# Patient Record
Sex: MALE | Race: WHITE | NOT HISPANIC OR LATINO | Employment: FULL TIME | URBAN - METROPOLITAN AREA
[De-identification: names, ages, dates, MRNs, and addresses within clinical notes are randomized per-mention and may not be internally consistent; named-entity substitution may affect disease eponyms.]

---

## 2017-05-03 ENCOUNTER — NON-PROVIDER VISIT (OUTPATIENT)
Dept: CARDIOLOGY | Facility: MEDICAL CENTER | Age: 57
End: 2017-05-03
Payer: COMMERCIAL

## 2017-05-03 DIAGNOSIS — I49.3 PVC (PREMATURE VENTRICULAR CONTRACTION): ICD-10-CM

## 2017-05-03 DIAGNOSIS — R00.0 SINUS TACHYCARDIA: ICD-10-CM

## 2017-05-03 DIAGNOSIS — R55 SYNCOPE AND COLLAPSE: ICD-10-CM

## 2017-05-03 DIAGNOSIS — I10 ESSENTIAL HYPERTENSION: ICD-10-CM

## 2017-05-05 DIAGNOSIS — I10 ESSENTIAL HYPERTENSION: ICD-10-CM

## 2017-05-05 DIAGNOSIS — R55 SYNCOPE AND COLLAPSE: ICD-10-CM

## 2017-05-08 LAB — EKG IMPRESSION: NORMAL

## 2017-05-08 PROCEDURE — 93224 XTRNL ECG REC UP TO 48 HRS: CPT | Performed by: INTERNAL MEDICINE

## 2017-05-11 ENCOUNTER — OFFICE VISIT (OUTPATIENT)
Dept: MEDICAL GROUP | Facility: MEDICAL CENTER | Age: 57
End: 2017-05-11
Payer: COMMERCIAL

## 2017-05-11 VITALS
TEMPERATURE: 98.1 F | BODY MASS INDEX: 27.59 KG/M2 | HEART RATE: 80 BPM | DIASTOLIC BLOOD PRESSURE: 86 MMHG | RESPIRATION RATE: 16 BRPM | WEIGHT: 215 LBS | OXYGEN SATURATION: 96 % | SYSTOLIC BLOOD PRESSURE: 124 MMHG | HEIGHT: 74 IN

## 2017-05-11 DIAGNOSIS — I10 ESSENTIAL HYPERTENSION: ICD-10-CM

## 2017-05-11 DIAGNOSIS — R19.7 DIARRHEA, UNSPECIFIED TYPE: ICD-10-CM

## 2017-05-11 DIAGNOSIS — K21.9 GASTROESOPHAGEAL REFLUX DISEASE, ESOPHAGITIS PRESENCE NOT SPECIFIED: ICD-10-CM

## 2017-05-11 DIAGNOSIS — Z76.89 ENCOUNTER TO ESTABLISH CARE: ICD-10-CM

## 2017-05-11 DIAGNOSIS — R55 SYNCOPE AND COLLAPSE: ICD-10-CM

## 2017-05-11 DIAGNOSIS — R21 RASH: ICD-10-CM

## 2017-05-11 DIAGNOSIS — R53.82 CHRONIC FATIGUE: ICD-10-CM

## 2017-05-11 PROCEDURE — 99204 OFFICE O/P NEW MOD 45 MIN: CPT | Performed by: PHYSICIAN ASSISTANT

## 2017-05-11 RX ORDER — OMEPRAZOLE 20 MG/1
20 CAPSULE, DELAYED RELEASE ORAL DAILY
Qty: 30 CAP | Refills: 3 | Status: SHIPPED | OUTPATIENT
Start: 2017-05-11 | End: 2018-12-27

## 2017-05-11 RX ORDER — CLOTRIMAZOLE 1 %
CREAM (GRAM) TOPICAL
Qty: 1 TUBE | Refills: 0 | Status: SHIPPED | OUTPATIENT
Start: 2017-05-11 | End: 2017-06-08 | Stop reason: SDUPTHER

## 2017-05-11 ASSESSMENT — PATIENT HEALTH QUESTIONNAIRE - PHQ9: CLINICAL INTERPRETATION OF PHQ2 SCORE: 0

## 2017-05-11 NOTE — ASSESSMENT & PLAN NOTE
Has a rash on his trunk that was diagnosed as tinea cruris. Oral and topical medications have not been effective. Denies itching.

## 2017-05-11 NOTE — ASSESSMENT & PLAN NOTE
Along the same time where he started his chronic fatigue he has had what he considers diarrhea. He will have soft stool once or twice daily. He had a colonoscopy performed about 3 years ago that was unremarkable. No recent weight loss. No rectal bleeding.

## 2017-05-11 NOTE — PROGRESS NOTES
Subjective:   Flash Alfred is a 57 y.o. male here today for establishing care and for 6 month history of chronic fatigue and diarrhea.    Diarrhea  Along the same time where he started his chronic fatigue he has had what he considers diarrhea. He will have soft stool once or twice daily. He had a colonoscopy performed about 3 years ago that was unremarkable. No recent weight loss. No rectal bleeding.    Syncope and collapse  This is a 57-year-old male who couple weeks ago was walking up 2 flights of stairs and passed out. He's been seen at Dr. Santos's office. He is wife looking for a new primary care. He's had a cardiac workup so far with a Holter monitor that was unremarkable. He is scheduled for an echo and stress tests.    Chronic fatigue  For the past 6 months leading up to his syncopal episode he is complaining of chronic fatigue. Fatigue is usually associated with exertion. He is not able to kayak recently. He had labs performed by his PCP which he were told her all within normal limits. Appears at CBC and chemistries were performed. Also a urinalysis. He denies that he falls asleep quickly. No significant issues with sleep apnea. No ED.    GERD (gastroesophageal reflux disease)  Has had reflux for greater than 5 years. Currently on some over-the-counter likely H2-blocker. Still has symptoms which may be worse at nighttime. He was provided a prescription and told to find it over-the-counter by his PCP. It appears they did not take the medication. Denies any dysphagia. No follow-up with GI.    HTN (hypertension)  Has a history of hypertension. Currently on Norvasc daily.    Rash  Has a rash on his trunk that was diagnosed as tinea cruris. Oral and topical medications have not been effective. Denies itching.       Current medicines (including changes today)  Current Outpatient Prescriptions   Medication Sig Dispense Refill   • omeprazole (PRILOSEC) 20 MG delayed-release capsule Take 1 Cap by  "mouth every day. 1/2 hour to same meal daily likely lunch. 30 Cap 3   • clotrimazole (LOTRIMIN) 1 % Cream Apply 3 times daily. 1 Tube 0   • amlodipine (NORVASC) 10 MG TABS Take 10 mg by mouth every day.     • alprazolam (XANAX) 0.5 MG TABS Take 1 Tab by mouth 3 times a day as needed for Anxiety. 90 Tab 2     No current facility-administered medications for this visit.     He  has a past medical history of Allergy, unspecified not elsewhere classified; Arrhythmia; GERD (gastroesophageal reflux disease); Headache; Headache, classical migraine; Hypertension; Hyperlipidemia; and Cervical spine fracture (CMS-HCC). He also has no past medical history of Anemia, Anxiety, Depression, Arthritis, Asthma, Blood transfusion, without reported diagnosis, Cancer (CMS-HCC), Unspecified cataract, CHF (congestive heart failure) (CMS-HCC), Clotting disorder (CMS-HCC), Chronic airway obstruction, not elsewhere classified, Emphysema, Diabetic neuropathy (CMS-HCC), Type II or unspecified type diabetes mellitus without mention of complication, not stated as uncontrolled, Glaucoma, Goiter, Heart attack (CMS-HCC), Heart murmur, Asymptomatic human immunodeficiency virus (HIV) infection status (CMS-HCC), IBD (inflammatory bowel disease), Kidney disease, Meningitis, Seizure (CMS-HCC), Stroke (CMS-HCC), Substance abuse, Thyroid disease, Tuberculosis, Ulcer (CMS-HCC), or Urinary tract infection, site not specified.    ROS   No chest pain, no shortness of breath, no abdominal pain and all other systems were reviewed and are negative.       Objective:     Blood pressure 124/86, pulse 80, temperature 36.7 °C (98.1 °F), resp. rate 16, height 1.877 m (6' 1.9\"), weight 97.523 kg (215 lb), SpO2 96 %. Body mass index is 27.68 kg/(m^2).   Physical Exam:  Constitutional: Alert, no distress.  Skin: Warm, dry, good turgor, erythematous raised lesions without any regular borders on his trunk..  Eye: Equal, round and reactive, conjunctiva clear, lids " normal.  ENMT: Lips without lesions, good dentition, oropharynx clear.  Neck: Trachea midline, no masses.   Lymph: No cervical or supraclavicular lymphadenopathy  Respiratory: Unlabored respiratory effort, lungs clear to auscultation, no wheezes, no ronchi.  Cardiovascular: Normal S1, S2, no murmur, no edema.  Abdomen: Soft, non-tender, no masses.  Psych: Alert and oriented x3, normal affect and mood.        Assessment and Plan:   The following treatment plan was discussed    1. Chronic fatigue  Unknown etiology. Brought on with exertion. Likely cardiac. No other abnormalities and labs were recent evaluations. Referred to cardiology. Await stress testing. We'll obtain previous medical records from PCP.  - REFERRAL TO CARDIOLOGY    2. Gastroesophageal reflux disease, esophagitis presence not specified  Chronic condition and uncontrolled. Prescribed omeprazole half hour prior to lunch daily. May take H2 blocker nighttime. May refer to GI in follow-up.  - omeprazole (PRILOSEC) 20 MG delayed-release capsule; Take 1 Cap by mouth every day. 1/2 hour to same meal daily likely lunch.  Dispense: 30 Cap; Refill: 3    3. Essential hypertension  Chronic condition. Continue Norvasc as directed. Ordered lipid profile.  - LIPID PANEL  - REFERRAL TO CARDIOLOGY    4. Diarrhea, unspecified type  Chronic condition. Colonoscopy unremarkable 3 years ago. We'll await cardiac workup and then refer to GI.    5. Rash  Prescribed Chlortrimazole 1% 3 times a day for 14 days. Refer to dermatology given chronicity.  - REFERRAL TO DERMATOLOGY    6. Syncope and collapse  Unknown etiology. Continue follow-up with cardiology. Refer to cardiology.  - REFERRAL TO CARDIOLOGY    7. Encounter to establish care      Followup: Return if symptoms worsen or fail to improve.    Please note that this dictation was created using voice recognition software. I have made every reasonable attempt to correct obvious errors, but I expect that there are errors of  grammar and possibly content that I did not discover before finalizing the note.

## 2017-05-11 NOTE — Clinical Note
[facility]  Art Pruitt PA-C  [address]  [place] [address]  Fax: [phone]   Authorization for Release/Disclosure of   Protected Health Information   Name: FLASH BAH : 1960 SSN: [id number]   Address: [place] [address] Phone:    [phone] (home)    I authorize the entity listed below to release/disclose the PHI below to:   [facility]/Art Pruitt PA-C and Art Pruitt PA-C   Provider or Entity Name:  [facility]   Address   City, State, Zip   Phone:      Fax:     Reason for request: continuity of care   Information to be released:    [ xxx ] LAST COLONOSCOPY,  including any PATH REPORT and follow-up  [  ] LAST FIT/COLOGUARD RESULT [  ] LAST DEXA  [  ] LAST MAMMOGRAM  [  ] LAST PAP  [  ] LAST LABS [  ] RETINA EXAM REPORT  [  ] IMMUNIZATION RECORDS  [  ] Release all info      [  ] Check here and initial the line next to each item to release ALL health information INCLUDING  _____ Care and treatment for drug and / or alcohol abuse  _____ HIV testing, infection status, or AIDS  _____ Genetic Testing    DATES OF SERVICE OR TIME PERIOD TO BE DISCLOSED: _____________  I understand and acknowledge that:  * This Authorization may be revoked at any time by you in writing, except if your health information has already been used or disclosed.  * Your health information that will be used or disclosed as a result of you signing this authorization could be re-disclosed by the recipient. If this occurs, your re-disclosed health information may no longer be protected by State or Federal laws.  * You may refuse to sign this Authorization. Your refusal will not affect your ability to obtain treatment.  * This Authorization becomes effective upon signing and will  on (date) __________.      If no date is indicated, this Authorization will  one (1) year from the signature date.    Name: Flash Bah    Signature:   Date:     2017            PLEASE FAX REQUESTED RECORDS BACK TO: [phone]

## 2017-05-11 NOTE — Clinical Note
[facility]  Art Pruitt PA-C  [address]  [place] [address]  Fax: [phone]   Authorization for Release/Disclosure of   Protected Health Information   Name: FLASH BAH : 1960 SSN: [id number]   [place] [address] Phone:    [phone] (home)    I authorize the entity listed below to release/disclose the PHI below to:   [facility]/Art Pruitt PA-C and Art Pruitt PA-C   Provider or Entity Name:  Dr Santos   Address   City, State, [facility]   Phone:      Fax:     Reason for request: continuity of care   Information to be released:    [  ] LAST COLONOSCOPY,  including any PATH REPORT and follow-up  [  ] LAST FIT/COLOGUARD RESULT [  ] LAST DEXA  [  ] LAST MAMMOGRAM  [  ] LAST PAP  [  ] LAST LABS [  ] RETINA EXAM REPORT  [  ] IMMUNIZATION RECORDS  [ xxx ] Release all info      [  ] Check here and initial the line next to each item to release ALL health information INCLUDING  _____ Care and treatment for drug and / or alcohol abuse  _____ HIV testing, infection status, or AIDS  _____ Genetic Testing    DATES OF SERVICE OR TIME PERIOD TO BE DISCLOSED: _____________  I understand and acknowledge that:  * This Authorization may be revoked at any time by you in writing, except if your health information has already been used or disclosed.  * Your health information that will be used or disclosed as a result of you signing this authorization could be re-disclosed by the recipient. If this occurs, your re-disclosed health information may no longer be protected by State or Federal laws.  * You may refuse to sign this Authorization. Your refusal will not affect your ability to obtain treatment.  * This Authorization becomes effective upon signing and will  on (date) __________.      If no date is indicated, this Authorization will  one (1) year from the signature date.    Name: Flash Bah    Signature:   Date:          5/11/2017       PLEASE FAX REQUESTED RECORDS BACK TO: [phone]

## 2017-05-11 NOTE — ASSESSMENT & PLAN NOTE
For the past 6 months leading up to his syncopal episode he is complaining of chronic fatigue. Fatigue is usually associated with exertion. He is not able to kayak recently. He had labs performed by his PCP which he were told her all within normal limits. Appears at CBC and chemistries were performed. Also a urinalysis. He denies that he falls asleep quickly. No significant issues with sleep apnea. No ED.

## 2017-05-11 NOTE — ASSESSMENT & PLAN NOTE
Has had reflux for greater than 5 years. Currently on some over-the-counter likely H2-blocker. Still has symptoms which may be worse at nighttime. He was provided a prescription and told to find it over-the-counter by his PCP. It appears they did not take the medication. Denies any dysphagia. No follow-up with GI.

## 2017-05-11 NOTE — ASSESSMENT & PLAN NOTE
This is a 57-year-old male who couple weeks ago was walking up 2 flights of stairs and passed out. He's been seen at Dr. Santos's office. He is wife looking for a new primary care. He's had a cardiac workup so far with a Holter monitor that was unremarkable. He is scheduled for an echo and stress tests.

## 2017-05-22 LAB
CHOLEST SERPL-MCNC: 233 MG/DL (ref 100–199)
COMMENT 011824: ABNORMAL
HDLC SERPL-MCNC: 47 MG/DL
LDLC SERPL CALC-MCNC: 120 MG/DL (ref 0–99)
TRIGL SERPL-MCNC: 328 MG/DL (ref 0–149)
VLDLC SERPL CALC-MCNC: 66 MG/DL (ref 5–40)

## 2017-05-24 ENCOUNTER — OFFICE VISIT (OUTPATIENT)
Dept: CARDIOLOGY | Facility: MEDICAL CENTER | Age: 57
End: 2017-05-24
Payer: COMMERCIAL

## 2017-05-24 VITALS
BODY MASS INDEX: 27.46 KG/M2 | SYSTOLIC BLOOD PRESSURE: 124 MMHG | WEIGHT: 214 LBS | DIASTOLIC BLOOD PRESSURE: 74 MMHG | HEIGHT: 74 IN | OXYGEN SATURATION: 96 % | HEART RATE: 76 BPM

## 2017-05-24 DIAGNOSIS — R55 SYNCOPE, UNSPECIFIED SYNCOPE TYPE: ICD-10-CM

## 2017-05-24 LAB — EKG IMPRESSION: NORMAL

## 2017-05-24 PROCEDURE — 99245 OFF/OP CONSLTJ NEW/EST HI 55: CPT | Performed by: INTERNAL MEDICINE

## 2017-05-24 PROCEDURE — 93000 ELECTROCARDIOGRAM COMPLETE: CPT | Performed by: INTERNAL MEDICINE

## 2017-05-24 ASSESSMENT — ENCOUNTER SYMPTOMS
PALPITATIONS: 0
LOSS OF CONSCIOUSNESS: 1
DIZZINESS: 0

## 2017-05-24 NOTE — PROGRESS NOTES
"Subjective:   Flash Alfred is a 57 y.o. male who presents today being seen in consultation today at the request of DESTIN Pruitt for evaluation of syncope.       Feels fatigued all the time.  Almost can feel heart beating. This has been going on for a couple months.      He was at house and felt weird.  Remembered he had not taken bp med.  Went upstairs and then remebers making it to the top of stairs but woke up about 15 steps foreward.  He \"had it together\" right when he woke up.  He has never passed out before.    He felt normal after a couple minutes.  Presented to care a week later and made an appointment.    Had holter that I have personally reviewed.  There was nothing on the monitor.  Lowest hr is 56  Not close to passing out since that episode.      Father had sudden death s/p cabg but had \"massive MI and \"        Past Medical History   Diagnosis Date   • Allergy, unspecified not elsewhere classified    • Arrhythmia    • GERD (gastroesophageal reflux disease)    • Headache    • Headache, classical migraine    • Hypertension    • Hyperlipidemia    • Cervical spine fracture (CMS-HCC)      C5-C6 compression fractures-halo x 8 month     Past Surgical History   Procedure Laterality Date   • Appendectomy     • Circumcision child     • Dental extraction(s)     • Abdominal exploration     • Eye surgery       lasix     Family History   Problem Relation Age of Onset   • Heart Disease Mother    • Hypertension Mother    • Hyperlipidemia Mother    • Alcohol/Drug Mother      etoh   • Diabetes Father    • Heart Disease Father    • Hypertension Father    • Hyperlipidemia Father    • Lung Disease Father      ex smoker   • Alcohol/Drug Father      etoh   • Cancer Sister      colon   • Alcohol/Drug Sister      etoh   • Hypertension Sister    • Hyperlipidemia Sister    • Hypertension Brother    • Hyperlipidemia Brother    • Hypertension Paternal Uncle      History   Smoking status   • Former Smoker -- 0.50 " "packs/day for 30 years   • Types: Cigarettes   • Quit date: 10/01/2000   Smokeless tobacco   • Never Used     No Known Allergies  Outpatient Encounter Prescriptions as of 5/24/2017   Medication Sig Dispense Refill   • omeprazole (PRILOSEC) 20 MG delayed-release capsule Take 1 Cap by mouth every day. 1/2 hour to same meal daily likely lunch. 30 Cap 3   • clotrimazole (LOTRIMIN) 1 % Cream Apply 3 times daily. 1 Tube 0   • amlodipine (NORVASC) 10 MG TABS Take 10 mg by mouth every day.     • alprazolam (XANAX) 0.5 MG TABS Take 1 Tab by mouth 3 times a day as needed for Anxiety. 90 Tab 2     No facility-administered encounter medications on file as of 5/24/2017.     Review of Systems   Constitutional: Positive for malaise/fatigue.   Cardiovascular: Negative for chest pain and palpitations.   Neurological: Positive for loss of consciousness. Negative for dizziness.   All other systems reviewed and are negative.       Objective:   /74 mmHg  Pulse 76  Ht 1.877 m (6' 1.9\")  Wt 97.07 kg (214 lb)  BMI 27.55 kg/m2  SpO2 96%    Physical Exam   Constitutional: He is oriented to person, place, and time. He appears well-developed and well-nourished. No distress.   HENT:   Head: Normocephalic and atraumatic.   Right Ear: External ear normal.   Left Ear: External ear normal.   Nose: Nose normal.   Mouth/Throat: Oropharynx is clear and moist.   Eyes: Conjunctivae and EOM are normal. Pupils are equal, round, and reactive to light. Right eye exhibits no discharge. Left eye exhibits no discharge. No scleral icterus.   Neck: Normal range of motion. Neck supple. No JVD present. No tracheal deviation present.   Cardiovascular: Normal rate, regular rhythm, normal heart sounds and intact distal pulses.  Exam reveals no gallop and no friction rub.    No murmur heard.  Pulmonary/Chest: Effort normal and breath sounds normal. No stridor. No respiratory distress. He has no wheezes. He has no rales. He exhibits no tenderness. "   Abdominal: Soft. He exhibits no distension. There is no tenderness.   Musculoskeletal: He exhibits no edema or tenderness.   Neurological: He is alert and oriented to person, place, and time. No cranial nerve deficit. Coordination normal.   Skin: Skin is warm and dry. No rash noted. He is not diaphoretic. No erythema. No pallor.   Psychiatric: He has a normal mood and affect. His behavior is normal. Judgment and thought content normal.   Vitals reviewed.      Assessment:     1. Syncope, unspecified syncope type  EKG     Reviewed holter and see know significant arrhythmia  Normal ecg today  reviwed labs.  Total triglycerides and total cholesterol elevated.   Medical Decision Making:  Today's Assessment / Status / Plan:   Syncope- await results of stress test.  Will get it moved up.  Get echo as well.  If both normal will consider ilr  htn well controlled.  Hypertriglyceridemia- counselled diet and exercise and weight loss and limiting alcohol to 2 beers at a time.

## 2017-05-24 NOTE — Clinical Note
"     [facility]   [address]  [place] [address]  Phone: [phone]  Fax: [phone]              Flash Alfred  1960    Encounter Date: 2017    Brandon Gong M.D.          PROGRESS NOTE:  Subjective:   Flash Alfred is a 57 y.o. male who presents today being seen in consultation today at the request of DESTIN Pruitt for evaluation of syncope.       Feels fatigued all the time.  Almost can feel heart beating. This has been going on for a couple months.      He was at house and felt weird.  Remembered he had not taken bp med.  Went upstairs and then remebers making it to the top of stairs but woke up about 15 steps foreward.  He \"had it together\" right when he woke up.  He has never passed out before.    He felt normal after a couple minutes.  Presented to care a week later and made an appointment.    Had holter that I have personally reviewed.  There was nothing on the monitor.  Lowest hr is 56  Not close to passing out since that episode.      Father had sudden death s/p cabg but had \"massive MI and \"        Past Medical History   Diagnosis Date   • Allergy, unspecified not elsewhere classified    • Arrhythmia    • GERD (gastroesophageal reflux disease)    • Headache    • Headache, classical migraine    • Hypertension    • Hyperlipidemia    • Cervical spine fracture (CMS-[facility])      C5-C6 compression fractures-halo x 8 month     Past Surgical History   Procedure Laterality Date   • Appendectomy     • Circumcision child     • Dental extraction(s)     • Abdominal exploration     • Eye surgery       lasix     Family History   Problem Relation Age of Onset   • Heart Disease Mother    • Hypertension Mother    • Hyperlipidemia Mother    • Alcohol/Drug Mother      etoh   • Diabetes Father    • Heart Disease Father    • Hypertension Father    • Hyperlipidemia Father    • Lung Disease Father      ex smoker   • Alcohol/Drug Father    " "etoh   • Cancer Sister      colon   • Alcohol/Drug Sister      etoh   • Hypertension Sister    • Hyperlipidemia Sister    • Hypertension Brother    • Hyperlipidemia Brother    • Hypertension Paternal Uncle      History   Smoking status   • Former Smoker -- 0.50 packs/day for 30 years   • Types: Cigarettes   • Quit date: 10/01/2000   Smokeless tobacco   • Never Used     No Known Allergies  Outpatient Encounter Prescriptions as of 5/24/2017   Medication Sig Dispense Refill   • omeprazole (PRILOSEC) 20 MG delayed-release capsule Take 1 Cap by mouth every day. 1/2 hour to same meal daily likely lunch. 30 Cap 3   • clotrimazole (LOTRIMIN) 1 % Cream Apply 3 times daily. 1 Tube 0   • amlodipine (NORVASC) 10 MG TABS Take 10 mg by mouth every day.     • alprazolam (XANAX) 0.5 MG TABS Take 1 Tab by mouth 3 times a day as needed for Anxiety. 90 Tab 2     No facility-administered encounter medications on file as of 5/24/2017.     Review of Systems   Constitutional: Positive for malaise/fatigue.   Cardiovascular: Negative for chest pain and palpitations.   Neurological: Positive for loss of consciousness. Negative for dizziness.   All other systems reviewed and are negative.       Objective:   /74 mmHg  Pulse 76  Ht 1.877 m (6' 1.9\")  Wt 97.07 kg (214 lb)  BMI 27.55 kg/m2  SpO2 96%    Physical Exam   Constitutional: He is oriented to person, place, and time. He appears well-developed and well-nourished. No distress.   HENT:   Head: Normocephalic and atraumatic.   Right Ear: External ear normal.   Left Ear: External ear normal.   Nose: Nose normal.   Mouth/Throat: Oropharynx is clear and moist.   Eyes: Conjunctivae and EOM are normal. Pupils are equal, round, and reactive to light. Right eye exhibits no discharge. Left eye exhibits no discharge. No scleral icterus.   Neck: Normal range of motion. Neck supple. No JVD present. No tracheal deviation present.   Cardiovascular: Normal rate, regular rhythm, normal heart " sounds and intact distal pulses.  Exam reveals no gallop and no friction rub.    No murmur heard.  Pulmonary/Chest: Effort normal and breath sounds normal. No stridor. No respiratory distress. He has no wheezes. He has no rales. He exhibits no tenderness.   Abdominal: Soft. He exhibits no distension. There is no tenderness.   Musculoskeletal: He exhibits no edema or tenderness.   Neurological: He is alert and oriented to person, place, and time. No cranial nerve deficit. Coordination normal.   Skin: Skin is warm and dry. No rash noted. He is not diaphoretic. No erythema. No pallor.   Psychiatric: He has a normal mood and affect. His behavior is normal. Judgment and thought content normal.   Vitals reviewed.      Assessment:     1. Syncope, unspecified syncope type  EKG     Reviewed holter and see know significant arrhythmia  Normal ecg today  reviwed labs.  Total triglycerides and total cholesterol elevated.   Medical Decision Making:  Today's Assessment / Status / Plan:   Syncope- await results of stress test.  Will get it moved up.  Get echo as well.  If both normal will consider ilr  htn well controlled.  Hypertriglyceridemia- counselled diet and exercise and weight loss and limiting alcohol to 2 beers at a time.      MAGDA KolbC  [address]  [address]  [place] [address]  VIA In Basket

## 2017-05-24 NOTE — MR AVS SNAPSHOT
"        Flash Alfred   2017 9:00 AM   Office Visit   MRN: [id number]    Department:  [facility]   Dept Phone:  [phone]    Description:  Male : 1960   Provider:  Brandon Gong M.D.           Reason for Visit     New Patient pt states he had episode well walking up stairs1 month ago      Allergies as of 2017     No Known Allergies      You were diagnosed with     Syncope, unspecified syncope type   [[id number]]         Vital Signs     Blood Pressure Pulse Height Weight Body Mass Index Oxygen Saturation    124/74 mmHg 76 1.877 m (6' 1.9\") 97.07 kg (214 lb) 27.55 kg/m2 96%    Smoking Status                   Former Smoker           Basic Information     Date Of Birth Sex Race Ethnicity Preferred Language    1960 Male White Non- English      Your appointments     Surya 15, 2017  9:15 AM   [place] [facility] [place]   [place] [facility] [place]    [address] [place] [address]   [phone]            2017 12:15 PM   [place] [facility] [place]   [place] [facility] [place]    [address] [place] [address]   [phone]           No prep            Sep 13, 2017 10:00 AM   FOLLOW UP with Brandon Gong M.D.   [facility] (--)    [address], [address]  [place] [id number] [phone]              Problem List              ICD-10-CM Priority Class Noted - Resolved    HTN (hypertension) I10   10/1/2014 - Present    GERD (gastroesophageal reflux disease) K21.9   10/1/2014 - Present    Syncope and collapse R55   2017 - Present    Chronic fatigue R53.82   2017 - Present    Diarrhea R19.7   2017 - Present    Rash R21   2017 - Present      Health Maintenance        Date Due Completion Dates    COLONOSCOPY 2010 ---    IMM DTaP/Tdap/Td Vaccine (2 - Td) 2025            Results     EKG      Component    Report    [facility]    Test Date:  2017  Pt " Name:    ROB BAH             Department: [facility]  MRN:        [id number]                      Room:  Gender:     M                            Technician: AM  :        1960                   Requested By:MALDONADO BAJWA  Order #:    [id number]                    Reading MD: Maldonado Bajwa MD    Measurements  Intervals                                Axis  Rate:       74                           P:          57  ND:         172                          QRS:        58  QRSD:       90                           T:          65  QT:         412  QTc:        457    Interpretive Statements  SINUS RHYTHM  Compared to ECG 2015 19:10:47  No significant change    Electronically Signed On 2017 9:13:01 PDT by Maldonado Bajwa MD                          Current Immunizations     Influenza Vaccine Quad Inj (Preserved) 10/1/2014    Tdap Vaccine 2015      Below and/or attached are the medications your provider expects you to take. Review all of your home medications and newly ordered medications with your provider and/or pharmacist. Follow medication instructions as directed by your provider and/or pharmacist. Please keep your medication list with you and share with your provider. Update the information when medications are discontinued, doses are changed, or new medications (including over-the-counter products) are added; and carry medication information at all times in the event of emergency situations     Allergies:  No Known Allergies          Medications  Valid as of: May 24, 2017 -  9:50 AM    Generic Name Brand Name Tablet Size Instructions for use    ALPRAZolam (Tab) XANAX 0.5 MG Take 1 Tab by mouth 3 times a day as needed for Anxiety.        AmLODIPine Besylate (Tab) NORVASC 10 MG Take 10 mg by mouth every day.        Clotrimazole (Cream) LOTRIMIN 1 % Apply 3 times daily.        Omeprazole (CAPSULE DELAYED RELEASE) PRILOSEC 20 MG Take 1 Cap by mouth every day. 1/2 hour to same meal daily likely lunch.        .                  Medicines prescribed today were sent to:     [id number] - [place], [place] - [address]    [address] [place] [address]    Phone: [phone] Fax: [phone]    Open 24 Hours?: No      Medication refill instructions:       If your prescription bottle indicates you have medication refills left, it is not necessary to call your provider’s office. Please contact your pharmacy and they will refill your medication.    If your prescription bottle indicates you do not have any refills left, you may request refills at any time through one of the following ways: The online [employer]ENT system (except Urgent Care), by calling your provider’s office, or by asking your pharmacy to contact your provider’s office with a refill request. Medication refills are processed only during regular business hours and may not be available until the next business day. Your provider may request additional information or to have a follow-up visit with you prior to refilling your medication.   *Please Note: Medication refills are assigned a new Rx number when refilled electronically. Your pharmacy may indicate that no refills were authorized even though a new prescription for the same medication is available at the pharmacy. Please request the medicine by name with the pharmacy before contacting your provider for a refill.        Your To Do List     Future Labs/Procedures Complete By Expires    Echocardiogram Comp w/o Cont  As directed 5/24/2018         ZENT Access Code: Activation code not generated  Current ZENT Status: Active

## 2017-06-01 ENCOUNTER — HOSPITAL ENCOUNTER (OUTPATIENT)
Dept: CARDIOLOGY | Facility: MEDICAL CENTER | Age: 57
End: 2017-06-01
Attending: FAMILY MEDICINE
Payer: COMMERCIAL

## 2017-06-01 DIAGNOSIS — R06.02 SHORTNESS OF BREATH: ICD-10-CM

## 2017-06-01 DIAGNOSIS — R55 SYNCOPE AND COLLAPSE: ICD-10-CM

## 2017-06-01 LAB — LV EJECT FRACT  99904: 60

## 2017-06-01 PROCEDURE — 93312 ECHO TRANSESOPHAGEAL: CPT

## 2017-06-01 PROCEDURE — 93350 STRESS TTE ONLY: CPT

## 2017-06-01 PROCEDURE — 93018 CV STRESS TEST I&R ONLY: CPT | Performed by: INTERNAL MEDICINE

## 2017-06-01 PROCEDURE — 93350 STRESS TTE ONLY: CPT | Mod: 26 | Performed by: INTERNAL MEDICINE

## 2017-06-01 PROCEDURE — 93017 CV STRESS TEST TRACING ONLY: CPT

## 2017-06-08 RX ORDER — CLOTRIMAZOLE 1 %
CREAM (GRAM) TOPICAL
Qty: 1 TUBE | Refills: 0 | Status: SHIPPED | OUTPATIENT
Start: 2017-06-08 | End: 2018-12-27

## 2017-06-23 ENCOUNTER — HOSPITAL ENCOUNTER (OUTPATIENT)
Dept: CARDIOLOGY | Facility: MEDICAL CENTER | Age: 57
End: 2017-06-23
Attending: INTERNAL MEDICINE
Payer: COMMERCIAL

## 2017-06-23 DIAGNOSIS — R55 SYNCOPE, UNSPECIFIED SYNCOPE TYPE: ICD-10-CM

## 2017-06-23 LAB
LV EJECT FRACT MOD 2C 99903: 61.66
LV EJECT FRACT MOD 4C 99902: 73.07
LV EJECT FRACT MOD BP 99901: 66.82

## 2017-06-23 PROCEDURE — 93306 TTE W/DOPPLER COMPLETE: CPT

## 2017-06-23 PROCEDURE — 93306 TTE W/DOPPLER COMPLETE: CPT | Mod: 26 | Performed by: INTERNAL MEDICINE

## 2018-10-19 ENCOUNTER — HOSPITAL ENCOUNTER (OUTPATIENT)
Dept: RADIOLOGY | Facility: MEDICAL CENTER | Age: 58
End: 2018-10-19
Attending: FAMILY MEDICINE
Payer: COMMERCIAL

## 2018-10-19 DIAGNOSIS — M25.512 PAIN OF BOTH SHOULDER JOINTS: ICD-10-CM

## 2018-10-19 DIAGNOSIS — M25.511 PAIN OF BOTH SHOULDER JOINTS: ICD-10-CM

## 2018-10-19 PROCEDURE — 72040 X-RAY EXAM NECK SPINE 2-3 VW: CPT

## 2018-10-19 PROCEDURE — 73030 X-RAY EXAM OF SHOULDER: CPT | Mod: LT

## 2018-10-19 PROCEDURE — 73030 X-RAY EXAM OF SHOULDER: CPT | Mod: RT

## 2018-11-21 ENCOUNTER — APPOINTMENT (OUTPATIENT)
Dept: RADIOLOGY | Facility: MEDICAL CENTER | Age: 58
End: 2018-11-21
Attending: NURSE PRACTITIONER
Payer: COMMERCIAL

## 2018-11-29 ENCOUNTER — HOSPITAL ENCOUNTER (OUTPATIENT)
Dept: RADIOLOGY | Facility: MEDICAL CENTER | Age: 58
End: 2018-11-29
Attending: NURSE PRACTITIONER
Payer: COMMERCIAL

## 2018-11-29 DIAGNOSIS — M54.12 RADICULAR SYNDROME OF UPPER LIMBS: ICD-10-CM

## 2018-11-29 PROCEDURE — 72141 MRI NECK SPINE W/O DYE: CPT

## 2018-11-29 PROCEDURE — 72040 X-RAY EXAM NECK SPINE 2-3 VW: CPT

## 2018-12-12 ENCOUNTER — HOSPITAL ENCOUNTER (OUTPATIENT)
Facility: MEDICAL CENTER | Age: 58
End: 2018-12-12
Attending: NEUROLOGICAL SURGERY | Admitting: NEUROLOGICAL SURGERY
Payer: COMMERCIAL

## 2018-12-27 ENCOUNTER — HOSPITAL ENCOUNTER (OUTPATIENT)
Dept: RADIOLOGY | Facility: MEDICAL CENTER | Age: 58
End: 2018-12-27
Attending: NEUROLOGICAL SURGERY
Payer: COMMERCIAL

## 2018-12-27 VITALS — HEIGHT: 74 IN | BODY MASS INDEX: 26.54 KG/M2 | WEIGHT: 206.79 LBS

## 2018-12-27 DIAGNOSIS — Z01.810 PRE-OPERATIVE CARDIOVASCULAR EXAMINATION: ICD-10-CM

## 2018-12-27 DIAGNOSIS — Z01.812 PRE-OPERATIVE LABORATORY EXAMINATION: ICD-10-CM

## 2018-12-27 DIAGNOSIS — Z01.811 PRE-OPERATIVE RESPIRATORY EXAMINATION: ICD-10-CM

## 2018-12-27 LAB
ALBUMIN SERPL BCP-MCNC: 4.4 G/DL (ref 3.2–4.9)
ALBUMIN/GLOB SERPL: 1.5 G/DL
ALP SERPL-CCNC: 32 U/L (ref 30–99)
ALT SERPL-CCNC: 40 U/L (ref 2–50)
ANION GAP SERPL CALC-SCNC: 9 MMOL/L (ref 0–11.9)
APTT PPP: 27.6 SEC (ref 24.7–36)
AST SERPL-CCNC: 18 U/L (ref 12–45)
BILIRUB SERPL-MCNC: 0.6 MG/DL (ref 0.1–1.5)
BUN SERPL-MCNC: 19 MG/DL (ref 8–22)
CALCIUM SERPL-MCNC: 9.5 MG/DL (ref 8.5–10.5)
CHLORIDE SERPL-SCNC: 106 MMOL/L (ref 96–112)
CO2 SERPL-SCNC: 25 MMOL/L (ref 20–33)
CREAT SERPL-MCNC: 1.18 MG/DL (ref 0.5–1.4)
EKG IMPRESSION: NORMAL
GLOBULIN SER CALC-MCNC: 2.9 G/DL (ref 1.9–3.5)
GLUCOSE SERPL-MCNC: 87 MG/DL (ref 65–99)
INR PPP: 0.96 (ref 0.87–1.13)
POTASSIUM SERPL-SCNC: 3.6 MMOL/L (ref 3.6–5.5)
PROT SERPL-MCNC: 7.3 G/DL (ref 6–8.2)
PROTHROMBIN TIME: 12.9 SEC (ref 12–14.6)
SODIUM SERPL-SCNC: 140 MMOL/L (ref 135–145)

## 2018-12-27 PROCEDURE — 85730 THROMBOPLASTIN TIME PARTIAL: CPT

## 2018-12-27 PROCEDURE — 80053 COMPREHEN METABOLIC PANEL: CPT

## 2018-12-27 PROCEDURE — 93010 ELECTROCARDIOGRAM REPORT: CPT | Performed by: INTERNAL MEDICINE

## 2018-12-27 PROCEDURE — 85610 PROTHROMBIN TIME: CPT

## 2018-12-27 PROCEDURE — 93005 ELECTROCARDIOGRAM TRACING: CPT

## 2018-12-27 PROCEDURE — 36415 COLL VENOUS BLD VENIPUNCTURE: CPT

## 2018-12-27 PROCEDURE — 71046 X-RAY EXAM CHEST 2 VIEWS: CPT

## 2019-01-02 ENCOUNTER — APPOINTMENT (OUTPATIENT)
Dept: ADMISSIONS | Facility: MEDICAL CENTER | Age: 59
End: 2019-01-02
Payer: COMMERCIAL

## 2019-01-02 DIAGNOSIS — Z01.812 PRE-OPERATIVE LABORATORY EXAMINATION: ICD-10-CM

## 2019-01-02 LAB
BASOPHILS # BLD AUTO: 0.7 % (ref 0–1.8)
BASOPHILS # BLD: 0.07 K/UL (ref 0–0.12)
EOSINOPHIL # BLD AUTO: 0.21 K/UL (ref 0–0.51)
EOSINOPHIL NFR BLD: 2.1 % (ref 0–6.9)
ERYTHROCYTE [DISTWIDTH] IN BLOOD BY AUTOMATED COUNT: 41.6 FL (ref 35.9–50)
HCT VFR BLD AUTO: 48.4 % (ref 42–52)
HGB BLD-MCNC: 16.8 G/DL (ref 14–18)
IMM GRANULOCYTES # BLD AUTO: 0.03 K/UL (ref 0–0.11)
IMM GRANULOCYTES NFR BLD AUTO: 0.3 % (ref 0–0.9)
LYMPHOCYTES # BLD AUTO: 2.85 K/UL (ref 1–4.8)
LYMPHOCYTES NFR BLD: 28.2 % (ref 22–41)
MCH RBC QN AUTO: 29.7 PG (ref 27–33)
MCHC RBC AUTO-ENTMCNC: 34.7 G/DL (ref 33.7–35.3)
MCV RBC AUTO: 85.5 FL (ref 81.4–97.8)
MONOCYTES # BLD AUTO: 0.84 K/UL (ref 0–0.85)
MONOCYTES NFR BLD AUTO: 8.3 % (ref 0–13.4)
NEUTROPHILS # BLD AUTO: 6.09 K/UL (ref 1.82–7.42)
NEUTROPHILS NFR BLD: 60.4 % (ref 44–72)
NRBC # BLD AUTO: 0 K/UL
NRBC BLD-RTO: 0 /100 WBC
PLATELET # BLD AUTO: 328 K/UL (ref 164–446)
PMV BLD AUTO: 9.1 FL (ref 9–12.9)
RBC # BLD AUTO: 5.66 M/UL (ref 4.7–6.1)
WBC # BLD AUTO: 10.1 K/UL (ref 4.8–10.8)

## 2019-01-02 PROCEDURE — 85025 COMPLETE CBC W/AUTO DIFF WBC: CPT

## 2019-01-02 PROCEDURE — 36415 COLL VENOUS BLD VENIPUNCTURE: CPT

## 2019-01-17 ENCOUNTER — HOSPITAL ENCOUNTER (INPATIENT)
Dept: HOSPITAL 8 - ORIP | Age: 59
LOS: 1 days | Discharge: HOME | DRG: 473 | End: 2019-01-18
Attending: NEUROLOGICAL SURGERY | Admitting: NEUROLOGICAL SURGERY
Payer: COMMERCIAL

## 2019-01-17 VITALS — HEIGHT: 74 IN | BODY MASS INDEX: 26.31 KG/M2 | WEIGHT: 205.03 LBS

## 2019-01-17 VITALS — DIASTOLIC BLOOD PRESSURE: 89 MMHG | SYSTOLIC BLOOD PRESSURE: 129 MMHG

## 2019-01-17 VITALS — SYSTOLIC BLOOD PRESSURE: 146 MMHG | DIASTOLIC BLOOD PRESSURE: 91 MMHG

## 2019-01-17 VITALS — SYSTOLIC BLOOD PRESSURE: 117 MMHG | DIASTOLIC BLOOD PRESSURE: 77 MMHG

## 2019-01-17 DIAGNOSIS — M54.12: ICD-10-CM

## 2019-01-17 DIAGNOSIS — M48.02: Primary | ICD-10-CM

## 2019-01-17 DIAGNOSIS — I10: ICD-10-CM

## 2019-01-17 PROCEDURE — 4A11X4G MONITORING OF PERIPHERAL NERVOUS ELECTRICAL ACTIVITY, INTRAOPERATIVE, EXTERNAL APPROACH: ICD-10-PCS | Performed by: NEUROLOGICAL SURGERY

## 2019-01-17 PROCEDURE — 95938 SOMATOSENSORY TESTING: CPT

## 2019-01-17 PROCEDURE — 95941 IONM REMOTE/>1 PT OR PER HR: CPT

## 2019-01-17 PROCEDURE — C1713 ANCHOR/SCREW BN/BN,TIS/BN: HCPCS

## 2019-01-17 PROCEDURE — 0RG20A0 FUSION OF 2 OR MORE CERVICAL VERTEBRAL JOINTS WITH INTERBODY FUSION DEVICE, ANTERIOR APPROACH, ANTERIOR COLUMN, OPEN APPROACH: ICD-10-PCS | Performed by: NEUROLOGICAL SURGERY

## 2019-01-17 PROCEDURE — 72040 X-RAY EXAM NECK SPINE 2-3 VW: CPT

## 2019-01-17 PROCEDURE — 0RB30ZZ EXCISION OF CERVICAL VERTEBRAL DISC, OPEN APPROACH: ICD-10-PCS | Performed by: NEUROLOGICAL SURGERY

## 2019-01-17 PROCEDURE — C1776 JOINT DEVICE (IMPLANTABLE): HCPCS

## 2019-01-17 RX ADMIN — FENTANYL CITRATE PRN MCG: 50 INJECTION INTRAMUSCULAR; INTRAVENOUS at 10:10

## 2019-01-17 RX ADMIN — SODIUM CHLORIDE AND POTASSIUM CHLORIDE SCH MLS/HR: .9; .15 SOLUTION INTRAVENOUS at 13:31

## 2019-01-17 RX ADMIN — METHOCARBAMOL PRN MG: 750 TABLET ORAL at 23:40

## 2019-01-17 RX ADMIN — FENTANYL CITRATE PRN MCG: 50 INJECTION INTRAMUSCULAR; INTRAVENOUS at 10:25

## 2019-01-17 RX ADMIN — HYDROMORPHONE HYDROCHLORIDE PRN MG: 2 INJECTION INTRAMUSCULAR; INTRAVENOUS; SUBCUTANEOUS at 10:20

## 2019-01-17 RX ADMIN — OXYCODONE HYDROCHLORIDE AND ACETAMINOPHEN PRN TAB: 5; 325 TABLET ORAL at 13:38

## 2019-01-17 RX ADMIN — OXYCODONE HYDROCHLORIDE AND ACETAMINOPHEN PRN TAB: 5; 325 TABLET ORAL at 21:56

## 2019-01-17 RX ADMIN — HYDROMORPHONE HYDROCHLORIDE PRN MG: 2 INJECTION INTRAMUSCULAR; INTRAVENOUS; SUBCUTANEOUS at 10:40

## 2019-01-17 RX ADMIN — FENTANYL CITRATE PRN MCG: 50 INJECTION INTRAMUSCULAR; INTRAVENOUS at 11:10

## 2019-01-17 RX ADMIN — METHOCARBAMOL PRN MG: 750 TABLET ORAL at 15:02

## 2019-01-17 RX ADMIN — CEFAZOLIN SODIUM SCH MLS/HR: 1 SOLUTION INTRAVENOUS at 23:41

## 2019-01-17 RX ADMIN — HYDROMORPHONE HYDROCHLORIDE PRN MG: 2 INJECTION INTRAMUSCULAR; INTRAVENOUS; SUBCUTANEOUS at 10:01

## 2019-01-17 RX ADMIN — CEFAZOLIN SODIUM SCH MLS/HR: 1 SOLUTION INTRAVENOUS at 16:17

## 2019-01-17 RX ADMIN — HYDROMORPHONE HYDROCHLORIDE PRN MG: 2 INJECTION INTRAMUSCULAR; INTRAVENOUS; SUBCUTANEOUS at 10:15

## 2019-01-17 RX ADMIN — OXYCODONE HYDROCHLORIDE AND ACETAMINOPHEN PRN TAB: 5; 325 TABLET ORAL at 17:33

## 2019-01-18 VITALS — SYSTOLIC BLOOD PRESSURE: 134 MMHG | DIASTOLIC BLOOD PRESSURE: 83 MMHG

## 2019-01-18 VITALS — DIASTOLIC BLOOD PRESSURE: 79 MMHG | SYSTOLIC BLOOD PRESSURE: 123 MMHG

## 2019-01-18 VITALS — DIASTOLIC BLOOD PRESSURE: 80 MMHG | SYSTOLIC BLOOD PRESSURE: 115 MMHG

## 2019-01-18 VITALS — SYSTOLIC BLOOD PRESSURE: 132 MMHG | DIASTOLIC BLOOD PRESSURE: 82 MMHG

## 2019-01-18 RX ADMIN — OXYCODONE HYDROCHLORIDE AND ACETAMINOPHEN PRN TAB: 5; 325 TABLET ORAL at 06:27

## 2019-01-18 RX ADMIN — OXYCODONE HYDROCHLORIDE AND ACETAMINOPHEN PRN TAB: 5; 325 TABLET ORAL at 02:13

## 2019-01-18 RX ADMIN — METHOCARBAMOL PRN MG: 750 TABLET ORAL at 06:27

## 2019-01-18 RX ADMIN — SODIUM CHLORIDE AND POTASSIUM CHLORIDE SCH MLS/HR: .9; .15 SOLUTION INTRAVENOUS at 06:32

## 2019-01-18 RX ADMIN — OXYCODONE HYDROCHLORIDE AND ACETAMINOPHEN PRN TAB: 5; 325 TABLET ORAL at 10:34

## 2019-02-11 ENCOUNTER — HOSPITAL ENCOUNTER (OUTPATIENT)
Dept: RADIOLOGY | Facility: MEDICAL CENTER | Age: 59
End: 2019-02-11
Attending: NEUROLOGICAL SURGERY
Payer: COMMERCIAL

## 2019-02-11 DIAGNOSIS — M48.02 SPINAL STENOSIS IN CERVICAL REGION: ICD-10-CM

## 2019-02-11 PROCEDURE — 72040 X-RAY EXAM NECK SPINE 2-3 VW: CPT

## 2019-03-11 ENCOUNTER — HOSPITAL ENCOUNTER (OUTPATIENT)
Dept: RADIOLOGY | Facility: MEDICAL CENTER | Age: 59
End: 2019-03-11
Attending: NEUROLOGICAL SURGERY
Payer: COMMERCIAL

## 2019-03-11 DIAGNOSIS — R51.9 FACIAL PAIN: ICD-10-CM

## 2019-03-11 PROCEDURE — 70450 CT HEAD/BRAIN W/O DYE: CPT

## 2019-05-08 ENCOUNTER — HOSPITAL ENCOUNTER (OUTPATIENT)
Dept: RADIOLOGY | Facility: MEDICAL CENTER | Age: 59
End: 2019-05-08
Attending: NEUROLOGICAL SURGERY
Payer: COMMERCIAL

## 2019-05-08 DIAGNOSIS — M48.02 SPINAL STENOSIS IN CERVICAL REGION: ICD-10-CM

## 2019-05-08 PROCEDURE — 72040 X-RAY EXAM NECK SPINE 2-3 VW: CPT

## 2019-07-16 ENCOUNTER — HOSPITAL ENCOUNTER (OUTPATIENT)
Dept: RADIOLOGY | Facility: MEDICAL CENTER | Age: 59
End: 2019-07-16
Attending: NEUROLOGICAL SURGERY
Payer: COMMERCIAL

## 2019-07-16 DIAGNOSIS — M48.02 SPINAL STENOSIS IN CERVICAL REGION: ICD-10-CM

## 2019-07-16 PROCEDURE — 72040 X-RAY EXAM NECK SPINE 2-3 VW: CPT

## 2019-09-23 ENCOUNTER — HOSPITAL ENCOUNTER (OUTPATIENT)
Dept: RADIOLOGY | Facility: MEDICAL CENTER | Age: 59
End: 2019-09-23
Attending: OTOLARYNGOLOGY
Payer: COMMERCIAL

## 2019-09-23 DIAGNOSIS — R47.02 DYSPHASIA: ICD-10-CM

## 2019-09-23 PROCEDURE — 74220 X-RAY XM ESOPHAGUS 1CNTRST: CPT

## 2019-09-23 PROCEDURE — 700112 HCHG RX REV CODE 229: Performed by: OTOLARYNGOLOGY

## 2019-09-23 PROCEDURE — A9270 NON-COVERED ITEM OR SERVICE: HCPCS | Performed by: OTOLARYNGOLOGY

## 2019-09-23 RX ADMIN — ANTACID/ANTIFLATULENT 1 PACKET: 380; 550; 10; 10 GRANULE, EFFERVESCENT ORAL at 14:35

## 2021-03-15 DIAGNOSIS — Z23 NEED FOR VACCINATION: ICD-10-CM
